# Patient Record
Sex: MALE | Race: ASIAN | NOT HISPANIC OR LATINO | URBAN - METROPOLITAN AREA
[De-identification: names, ages, dates, MRNs, and addresses within clinical notes are randomized per-mention and may not be internally consistent; named-entity substitution may affect disease eponyms.]

---

## 2018-09-05 ENCOUNTER — NEW PATIENT (OUTPATIENT)
Dept: URBAN - METROPOLITAN AREA CLINIC 30 | Facility: CLINIC | Age: 68
End: 2018-09-05
Payer: MEDICARE

## 2018-09-05 DIAGNOSIS — Z96.1 PRESENCE OF INTRAOCULAR LENS: ICD-10-CM

## 2018-09-05 DIAGNOSIS — E10.9 TYPE 1 DIABETES MELLITUS WITHOUT COMPLICATIONS: Primary | ICD-10-CM

## 2018-09-05 DIAGNOSIS — H26.492 OTHER SECONDARY CATARACT, LEFT EYE: ICD-10-CM

## 2018-09-05 DIAGNOSIS — H31.011 MACULA SCARS OF POSTERIOR POLE (POST TRAUMATIC), RIGHT EYE: ICD-10-CM

## 2018-09-05 DIAGNOSIS — H43.813 VITREOUS DEGENERATION, BILATERAL: ICD-10-CM

## 2018-09-05 PROCEDURE — 76514 ECHO EXAM OF EYE THICKNESS: CPT | Performed by: OPTOMETRIST

## 2018-09-05 PROCEDURE — 99204 OFFICE O/P NEW MOD 45 MIN: CPT | Performed by: OPTOMETRIST

## 2018-09-05 PROCEDURE — 92134 CPTRZ OPH DX IMG PST SGM RTA: CPT | Performed by: OPTOMETRIST

## 2018-09-05 RX ORDER — BRIMONIDINE TARTRATE, TIMOLOL MALEATE 2; 5 MG/ML; MG/ML
SOLUTION/ DROPS OPHTHALMIC
Qty: 1 | Refills: 4 | Status: INACTIVE
Start: 2018-09-05 | End: 2019-02-20

## 2018-09-05 RX ORDER — BIMATOPROST 0.1 MG/ML
0.01 % SOLUTION/ DROPS OPHTHALMIC
Qty: 1 | Refills: 4 | Status: INACTIVE
Start: 2018-09-05 | End: 2019-02-20

## 2018-09-05 ASSESSMENT — KERATOMETRY
OS: 38.10
OD: 37.04

## 2018-09-05 ASSESSMENT — INTRAOCULAR PRESSURE
OS: 8
OD: 17

## 2018-09-05 ASSESSMENT — VISUAL ACUITY: OS: 20/40

## 2018-09-19 ENCOUNTER — FOLLOW UP ESTABLISHED (OUTPATIENT)
Dept: URBAN - METROPOLITAN AREA CLINIC 30 | Facility: CLINIC | Age: 68
End: 2018-09-19
Payer: MEDICARE

## 2018-09-19 DIAGNOSIS — H40.1133 PRIMARY OPEN-ANGLE GLAUCOMA, BILATERAL, SEVERE STAGE: Primary | ICD-10-CM

## 2018-09-19 PROCEDURE — 99213 OFFICE O/P EST LOW 20 MIN: CPT | Performed by: OPTOMETRIST

## 2018-09-19 PROCEDURE — 92083 EXTENDED VISUAL FIELD XM: CPT | Performed by: OPTOMETRIST

## 2018-09-19 ASSESSMENT — VISUAL ACUITY
OS: 20/30
OD: 20/HM

## 2018-09-19 ASSESSMENT — INTRAOCULAR PRESSURE
OS: 15
OD: 16

## 2018-10-09 ENCOUNTER — Encounter (OUTPATIENT)
Dept: URBAN - METROPOLITAN AREA CLINIC 30 | Facility: CLINIC | Age: 68
End: 2018-10-09
Payer: MEDICARE

## 2018-10-09 DIAGNOSIS — Z01.818 ENCOUNTER FOR OTHER PREPROCEDURAL EXAMINATION: Primary | ICD-10-CM

## 2018-10-09 PROCEDURE — 99213 OFFICE O/P EST LOW 20 MIN: CPT | Performed by: PHYSICIAN ASSISTANT

## 2018-10-09 RX ORDER — METFORMIN HYDROCHLORIDE 1000 MG/1
TABLET, FILM COATED ORAL
Qty: 0 | Refills: 0 | Status: INACTIVE
Start: 2018-10-09 | End: 2019-06-21

## 2018-10-09 RX ORDER — OXYCODONE HYDROCHLORIDE 15 MG/1
15 MG TABLET ORAL
Qty: 0 | Refills: 0 | Status: INACTIVE
Start: 2018-10-09 | End: 2019-02-20

## 2018-10-30 ENCOUNTER — SURGERY (OUTPATIENT)
Dept: URBAN - METROPOLITAN AREA SURGERY 12 | Facility: SURGERY | Age: 68
End: 2018-10-30
Payer: MEDICARE

## 2018-10-30 PROCEDURE — 66821 AFTER CATARACT LASER SURGERY: CPT | Performed by: OPHTHALMOLOGY

## 2019-02-20 ENCOUNTER — FOLLOW UP ESTABLISHED (OUTPATIENT)
Dept: URBAN - METROPOLITAN AREA CLINIC 30 | Facility: CLINIC | Age: 69
End: 2019-02-20
Payer: MEDICARE

## 2019-02-20 DIAGNOSIS — H10.45 OTHER CHRONIC ALLERGIC CONJUNCTIVITIS: Primary | ICD-10-CM

## 2019-02-20 PROCEDURE — 92012 INTRM OPH EXAM EST PATIENT: CPT | Performed by: OPTOMETRIST

## 2019-02-20 RX ORDER — OXYCODONE HYDROCHLORIDE 15 MG/1
15 MG TABLET ORAL
Qty: 0 | Refills: 0 | Status: INACTIVE
Start: 2019-02-20 | End: 2019-06-21

## 2019-02-20 RX ORDER — AZELASTINE HYDROCHLORIDE 0.5 MG/ML
0.05 % SOLUTION/ DROPS OPHTHALMIC
Qty: 1 | Refills: 11 | Status: INACTIVE
Start: 2019-02-20 | End: 2019-06-21

## 2019-02-20 RX ORDER — BIMATOPROST 0.1 MG/ML
0.01 % SOLUTION/ DROPS OPHTHALMIC
Qty: 1 | Refills: 4 | Status: INACTIVE
Start: 2019-02-20 | End: 2019-11-05

## 2019-02-20 RX ORDER — PREDNISOLONE ACETATE 10 MG/ML
1 % SUSPENSION/ DROPS OPHTHALMIC
Qty: 1 | Refills: 0 | Status: INACTIVE
Start: 2019-02-20 | End: 2019-06-21

## 2019-02-20 RX ORDER — BRIMONIDINE TARTRATE, TIMOLOL MALEATE 2; 5 MG/ML; MG/ML
SOLUTION/ DROPS OPHTHALMIC
Qty: 1 | Refills: 4 | Status: INACTIVE
Start: 2019-02-20 | End: 2019-10-22

## 2019-02-20 ASSESSMENT — INTRAOCULAR PRESSURE
OS: 24
OD: 25

## 2019-06-21 ENCOUNTER — FOLLOW UP ESTABLISHED (OUTPATIENT)
Dept: URBAN - METROPOLITAN AREA CLINIC 30 | Facility: CLINIC | Age: 69
End: 2019-06-21
Payer: MEDICARE

## 2019-06-21 PROCEDURE — 99213 OFFICE O/P EST LOW 20 MIN: CPT | Performed by: OPTOMETRIST

## 2019-06-21 ASSESSMENT — INTRAOCULAR PRESSURE
OD: 21
OS: 20

## 2019-09-20 ENCOUNTER — FOLLOW UP ESTABLISHED (OUTPATIENT)
Dept: URBAN - METROPOLITAN AREA CLINIC 30 | Facility: CLINIC | Age: 69
End: 2019-09-20
Payer: MEDICARE

## 2019-09-20 DIAGNOSIS — E11.9 TYPE 2 DIABETES MELLITUS WITHOUT COMPLICATIONS: ICD-10-CM

## 2019-09-20 DIAGNOSIS — Z79.4 LONG TERM (CURRENT) USE OF INSULIN: ICD-10-CM

## 2019-09-20 PROCEDURE — 92014 COMPRE OPH EXAM EST PT 1/>: CPT | Performed by: OPTOMETRIST

## 2019-09-20 PROCEDURE — 92133 CPTRZD OPH DX IMG PST SGM ON: CPT | Performed by: OPTOMETRIST

## 2019-09-20 ASSESSMENT — KERATOMETRY
OS: 38.27
OD: 38.25

## 2019-09-20 ASSESSMENT — VISUAL ACUITY
OD: 20/NLP
OS: 20/25

## 2019-09-20 ASSESSMENT — INTRAOCULAR PRESSURE
OS: 16
OD: 18

## 2019-12-20 ENCOUNTER — FOLLOW UP ESTABLISHED (OUTPATIENT)
Dept: URBAN - METROPOLITAN AREA CLINIC 30 | Facility: CLINIC | Age: 69
End: 2019-12-20
Payer: MEDICARE

## 2019-12-20 PROCEDURE — 99213 OFFICE O/P EST LOW 20 MIN: CPT | Performed by: OPTOMETRIST

## 2019-12-20 PROCEDURE — 92083 EXTENDED VISUAL FIELD XM: CPT | Performed by: OPTOMETRIST

## 2019-12-20 ASSESSMENT — INTRAOCULAR PRESSURE
OS: 10
OD: 18

## 2020-02-26 ENCOUNTER — NEW PATIENT (OUTPATIENT)
Dept: URBAN - METROPOLITAN AREA CLINIC 24 | Facility: CLINIC | Age: 70
End: 2020-02-26
Payer: MEDICARE

## 2020-02-26 PROCEDURE — 92014 COMPRE OPH EXAM EST PT 1/>: CPT | Performed by: OPHTHALMOLOGY

## 2020-02-26 PROCEDURE — 92250 FUNDUS PHOTOGRAPHY W/I&R: CPT | Performed by: OPHTHALMOLOGY

## 2020-02-26 PROCEDURE — 92083 EXTENDED VISUAL FIELD XM: CPT | Performed by: OPHTHALMOLOGY

## 2020-02-26 PROCEDURE — 92020 GONIOSCOPY: CPT | Performed by: OPHTHALMOLOGY

## 2020-02-26 ASSESSMENT — INTRAOCULAR PRESSURE
OD: 14
OS: 10

## 2020-03-12 ENCOUNTER — FOLLOW UP ESTABLISHED (OUTPATIENT)
Dept: URBAN - METROPOLITAN AREA CLINIC 24 | Facility: CLINIC | Age: 70
End: 2020-03-12
Payer: MEDICARE

## 2020-03-12 PROCEDURE — 92014 COMPRE OPH EXAM EST PT 1/>: CPT | Performed by: OPHTHALMOLOGY

## 2020-03-12 ASSESSMENT — INTRAOCULAR PRESSURE
OD: 26
OS: 14

## 2020-05-20 ENCOUNTER — FOLLOW UP ESTABLISHED (OUTPATIENT)
Dept: URBAN - METROPOLITAN AREA CLINIC 24 | Facility: CLINIC | Age: 70
End: 2020-05-20
Payer: MEDICARE

## 2020-05-20 PROCEDURE — 92014 COMPRE OPH EXAM EST PT 1/>: CPT | Performed by: OPHTHALMOLOGY

## 2020-05-20 RX ORDER — DORZOLAMIDE HYDROCHLORIDE AND TIMOLOL MALEATE 20; 5 MG/ML; MG/ML
SOLUTION/ DROPS OPHTHALMIC
Qty: 1 | Refills: 1 | Status: INACTIVE
Start: 2020-05-20 | End: 2020-11-02

## 2020-05-20 ASSESSMENT — INTRAOCULAR PRESSURE
OD: 22
OS: 15

## 2020-06-17 ENCOUNTER — FOLLOW UP ESTABLISHED (OUTPATIENT)
Dept: URBAN - METROPOLITAN AREA CLINIC 24 | Facility: CLINIC | Age: 70
End: 2020-06-17
Payer: MEDICARE

## 2020-06-17 PROCEDURE — 92012 INTRM OPH EXAM EST PATIENT: CPT | Performed by: OPHTHALMOLOGY

## 2020-06-17 ASSESSMENT — INTRAOCULAR PRESSURE
OD: 14
OS: 11

## 2020-10-14 ENCOUNTER — FOLLOW UP ESTABLISHED (OUTPATIENT)
Dept: URBAN - METROPOLITAN AREA CLINIC 30 | Facility: CLINIC | Age: 70
End: 2020-10-14
Payer: MEDICARE

## 2020-10-14 PROCEDURE — 92014 COMPRE OPH EXAM EST PT 1/>: CPT | Performed by: OPHTHALMOLOGY

## 2020-10-14 ASSESSMENT — INTRAOCULAR PRESSURE
OS: 15
OD: 17

## 2020-10-23 ENCOUNTER — SURGERY (OUTPATIENT)
Dept: URBAN - METROPOLITAN AREA SURGERY 12 | Facility: SURGERY | Age: 70
End: 2020-10-23
Payer: MEDICARE

## 2020-10-23 PROCEDURE — 65855 TRABECULOPLASTY LASER SURG: CPT | Performed by: OPHTHALMOLOGY

## 2021-01-08 ENCOUNTER — FOLLOW UP ESTABLISHED (OUTPATIENT)
Dept: URBAN - METROPOLITAN AREA CLINIC 10 | Facility: CLINIC | Age: 71
End: 2021-01-08
Payer: MEDICARE

## 2021-01-08 PROCEDURE — 99213 OFFICE O/P EST LOW 20 MIN: CPT | Performed by: OPHTHALMOLOGY

## 2021-01-08 ASSESSMENT — INTRAOCULAR PRESSURE
OS: 11
OD: 19

## 2021-03-31 ENCOUNTER — OFFICE VISIT (OUTPATIENT)
Dept: URBAN - METROPOLITAN AREA CLINIC 30 | Facility: CLINIC | Age: 71
End: 2021-03-31
Payer: MEDICARE

## 2021-03-31 PROCEDURE — 99213 OFFICE O/P EST LOW 20 MIN: CPT | Performed by: OPHTHALMOLOGY

## 2021-03-31 ASSESSMENT — INTRAOCULAR PRESSURE
OS: 11
OD: 22

## 2021-03-31 NOTE — IMPRESSION/PLAN
Impression: Primary open-angle glaucoma, severe stage, bilateral
Previous medications used: Combigan - pain and lid irriation with use, recommend patient stop Combigan * Monocular patient. Plan: Pt has Glaucoma    Gonio : ss trace pg / cbb 1+ pg        Pachs: 482/485    Today's IOP :   22, 11 Tmax & date :  25/24 02/20/19 Target IOP low to mid teens Pt denies Fhx of Glaucoma Left eye is the better seeing eye HVF:  OD unable OS inferior hemifield with small SN scotoma C/D: .75-. 8 thin temporal rim OS 
OCT: mod loss OS Pt denies Sulfa Allergy   // Pt denies Lung /Heart dx Plan :
1. Cont:
Lumigan QHS OU Dorzol/Timolol bid ou 2. Patient's IOP is stable. No changes being made. 3. Return in 3 months for IOP check with optometry.  
4. Return in 6 months for IOP  check, VF and RNFL with Dr Rebecca Amador

## 2021-06-30 ENCOUNTER — OFFICE VISIT (OUTPATIENT)
Dept: URBAN - METROPOLITAN AREA CLINIC 30 | Facility: CLINIC | Age: 71
End: 2021-06-30
Payer: MEDICARE

## 2021-06-30 PROCEDURE — 99213 OFFICE O/P EST LOW 20 MIN: CPT | Performed by: OPTOMETRIST

## 2021-06-30 ASSESSMENT — INTRAOCULAR PRESSURE
OS: 10
OS: 12
OD: 14

## 2021-06-30 NOTE — IMPRESSION/PLAN
Impression: Primary open-angle glaucoma, severe stage, bilateral
Previous medications used: Combigan - pain and lid irritation with use, recommend patient stop Combigan Pachs: 482/485 Target IOP low to mid teens/  Tmax & date :  25/24 02/20/19 Pt denies Fhx of Glaucoma Left eye is the better seeing eye Pt denies Sulfa Allergy   // Pt denies Lung /Heart dx * Monocular patient. Plan: Pt has Glaucoma    Gonio : ss trace pg / cbb 1+ pg     Today's IOP :   (14,12) doing well on Lumigan qhs OU and dorzolamide/timolol BID OU. HVF:  OD unable OS inferior hemifield with small SN scotoma C/D: .75-. 8 thin temporal rim OS 
OCT: mod loss OS

CPM. See Dr. Harmeet Marcelino as scheduled

## 2021-12-03 ENCOUNTER — OFFICE VISIT (OUTPATIENT)
Dept: URBAN - METROPOLITAN AREA CLINIC 24 | Facility: CLINIC | Age: 71
End: 2021-12-03
Payer: MEDICARE

## 2021-12-03 PROCEDURE — 99213 OFFICE O/P EST LOW 20 MIN: CPT | Performed by: OPHTHALMOLOGY

## 2021-12-03 RX ORDER — BIMATOPROST 0.1 MG/ML
0.01 % SOLUTION/ DROPS OPHTHALMIC
Qty: 2.5 | Refills: 1 | Status: INACTIVE
Start: 2021-12-03 | End: 2021-12-08

## 2021-12-03 RX ORDER — DORZOLAMIDE HYDROCHLORIDE AND TIMOLOL MALEATE 20; 5 MG/ML; MG/ML
SOLUTION/ DROPS OPHTHALMIC
Qty: 10 | Refills: 0 | Status: INACTIVE
Start: 2021-12-03 | End: 2022-01-12

## 2021-12-03 ASSESSMENT — INTRAOCULAR PRESSURE
OD: 15
OS: 11

## 2021-12-03 NOTE — IMPRESSION/PLAN
Impression: Primary open-angle glaucoma, severe stage, bilateral
Previous medications used: Combigan - pain and lid irriation with use, recommend patient stop Combigan * Monocular patient. Plan: Pt has Glaucoma    Gonio : ss trace pg / cbb 1+ pg        Pachs: 482/485    Today's IOP :  15/11 Tmax  :  25/24 02/20/19 Target IOP low to mid teens Pt denies Fhx of Glaucoma Left eye is the better seeing eye HVF:  OD unable OS inferior hemifield with small SN scotoma C/D: .75-. 8 thin temporal rim OS 
OCT: mod loss OS Pt denies Sulfa Allergy   // Pt denies Lung /Heart dx Plan :
1. Cont:
Lumigan QHS OU Dorzol/Timolol bid ou 2. Patient's IOP is stable. No changes being made.  
3. Return in 3 months IOP  check, VF and RNFL with Dr Love Zepeda

## 2022-03-03 ENCOUNTER — OFFICE VISIT (OUTPATIENT)
Dept: URBAN - METROPOLITAN AREA CLINIC 24 | Facility: CLINIC | Age: 72
End: 2022-03-03
Payer: MEDICARE

## 2022-03-03 DIAGNOSIS — H04.123 DRY EYE SYNDROME OF BILATERAL LACRIMAL GLANDS: ICD-10-CM

## 2022-03-03 PROCEDURE — 92083 EXTENDED VISUAL FIELD XM: CPT | Performed by: OPHTHALMOLOGY

## 2022-03-03 PROCEDURE — 99213 OFFICE O/P EST LOW 20 MIN: CPT | Performed by: OPHTHALMOLOGY

## 2022-03-03 ASSESSMENT — INTRAOCULAR PRESSURE
OD: 14
OS: 9

## 2022-03-03 NOTE — IMPRESSION/PLAN
Impression: Primary open-angle glaucoma, severe stage, bilateral
Previous medications used: Combigan - pain and lid irriation with use, recommend patient stop Combigan * Monocular patient. Plan: Pt has Glaucoma    Gonio : ss trace pg / cbb 1+ pg        Pachs: 482/485    Today's IOP :  14/9      Tmax  :  25/24 02/20/19 Target IOP low to mid teens Pt denies Fhx of Glaucoma Left eye is the better seeing eye HVF:  OD unable OS inferior hemifield with small SN scotoma C/D: .75-. 8 thin temporal rim OS 
OCT: mod loss OS Pt denies Sulfa Allergy // Pt denies Lung /Heart dx Plan :
1. Cont:
Lumigan QHS OU Dorzol/Timolol bid ou 2. Patient's IOP is stable. No changes being made.  
3. Return in 3 months IOP  check with Dr. Mounika Dove then 6 months with repeat HVF OS Dr. Matt Rosas

## 2022-06-17 ENCOUNTER — OFFICE VISIT (OUTPATIENT)
Dept: URBAN - METROPOLITAN AREA CLINIC 24 | Facility: CLINIC | Age: 72
End: 2022-06-17
Payer: MEDICARE

## 2022-06-17 DIAGNOSIS — H40.1133 PRIMARY OPEN-ANGLE GLAUCOMA, BILATERAL, SEVERE STAGE: Primary | ICD-10-CM

## 2022-06-17 DIAGNOSIS — H04.123 DRY EYE SYNDROME OF BILATERAL LACRIMAL GLANDS: ICD-10-CM

## 2022-06-17 PROCEDURE — 99213 OFFICE O/P EST LOW 20 MIN: CPT | Performed by: OPHTHALMOLOGY

## 2022-06-17 ASSESSMENT — INTRAOCULAR PRESSURE
OD: 13
OS: 8

## 2022-06-17 NOTE — IMPRESSION/PLAN
Impression: Primary open-angle glaucoma, severe stage, bilateral
Previous medications used: Combigan - pain and lid irriation with use, recommend patient stop Combigan * Monocular patient. Plan: Pt has Glaucoma    Gonio: ss trace pg / cbb 1+ pg        Pachs: 482/485    Today's IOP :  13/8    Tmax  :  25/24 02/20/19 Target IOP low to mid teens Pt denies Fhx of Glaucoma Left eye is the better seeing eye HVF:  OD unable OS inferior hemifield with small SN scotoma C/D: .75-. 8 thin temporal rim OS 
OCT: mod loss OS Pt denies Sulfa Allergy // Pt denies Lung /Heart dx Plan :
1. Continue:
Lumigan QHS OU Dorzolamide/Timolol bid ou 2. Patient's IOP is stable. No changes being made. 3. Return as scheduled in [address] [place] [address] for iop check.

## 2022-12-07 ENCOUNTER — OFFICE VISIT (OUTPATIENT)
Dept: URBAN - METROPOLITAN AREA CLINIC 30 | Facility: CLINIC | Age: 72
End: 2022-12-07
Payer: MEDICARE

## 2022-12-07 DIAGNOSIS — H40.1133 PRIMARY OPEN-ANGLE GLAUCOMA, BILATERAL, SEVERE STAGE: Primary | ICD-10-CM

## 2022-12-07 PROCEDURE — 99213 OFFICE O/P EST LOW 20 MIN: CPT | Performed by: OPHTHALMOLOGY

## 2022-12-07 PROCEDURE — 92083 EXTENDED VISUAL FIELD XM: CPT | Performed by: OPHTHALMOLOGY

## 2022-12-07 ASSESSMENT — INTRAOCULAR PRESSURE
OS: 13
OD: 16

## 2022-12-07 NOTE — IMPRESSION/PLAN
Impression: Primary open-angle glaucoma, severe stage, bilateral
Previous medications used: Combigan - pain and lid irriation with use, recommend patient stop Combigan * Monocular patient. Plan: Pt has Glaucoma    Gonio: ss trace pg / cbb 1+ pg        Pachs: 482/485    Today's IOP :  16/13  Tmax  :  25/24 02/20/19 Target IOP low to mid teens Pt denies Fhx of Glaucoma Left eye is the better seeing eye HVF:  OD unable OS inferior hemifield with small SN scotoma C/D: .75-. 8 thin temporal rim OS 
OCT: mod loss OS Pt denies Sulfa Allergy // Pt denies Lung /Heart dx Plan :
1. Continue:
Lumigan QHS OU Dorzolamide/Timolol bid ou 2. Patient's IOP is stable. No changes being made.

## 2023-03-29 ENCOUNTER — OFFICE VISIT (OUTPATIENT)
Dept: URBAN - METROPOLITAN AREA CLINIC 30 | Facility: CLINIC | Age: 73
End: 2023-03-29
Payer: MEDICARE

## 2023-03-29 DIAGNOSIS — H40.1133 PRIMARY OPEN-ANGLE GLAUCOMA, BILATERAL, SEVERE STAGE: Primary | ICD-10-CM

## 2023-03-29 PROCEDURE — 99213 OFFICE O/P EST LOW 20 MIN: CPT | Performed by: OPHTHALMOLOGY

## 2023-03-29 ASSESSMENT — INTRAOCULAR PRESSURE
OS: 16
OD: 23
OS: 11
OD: 17

## 2023-03-29 NOTE — IMPRESSION/PLAN
Impression: Primary open-angle glaucoma, severe stage, bilateral
(+)Pancreatic cancer (S/P [facility]ipple) Previous medications used: Combigan - pain and lid irriation with use, recommend patient stop Combigan * Monocular patient. Plan: Pt has Glaucoma    Gonio: ss trace pg / cbb 1+ pg        Pachs: 482/485    Today's IOP :  23/16Tmax  :  25/24 02/20/19 Target IOP low to mid teens Pt denies Fhx of Glaucoma Left eye is the better seeing eye HVF:  OD unable OS inferior hemifield with small SN scotoma C/D: .75-. 8 thin temporal rim OS 
OCT: mod loss OS Pt denies Sulfa Allergy // Pt denies Lung /Heart dx Plan :
1. Continue:
Lumigan QHS OU Dorzolamide/Timolol bid ou 2. Patient's IOP is high today. Patient currently experiencing pain 2/2 Pancreatic cancer. VF results also poor.  
3. RTC 1 month with Dr. Antonio Darling If no improvement in IOP or confirmed decrease on VF send patient for SLT

## 2023-04-19 ENCOUNTER — OFFICE VISIT (OUTPATIENT)
Dept: URBAN - METROPOLITAN AREA CLINIC 30 | Facility: CLINIC | Age: 73
End: 2023-04-19
Payer: MEDICARE

## 2023-04-19 DIAGNOSIS — H40.1133 PRIMARY OPEN-ANGLE GLAUCOMA, BILATERAL, SEVERE STAGE: Primary | ICD-10-CM

## 2023-04-19 PROCEDURE — 92083 EXTENDED VISUAL FIELD XM: CPT

## 2023-04-19 PROCEDURE — 99214 OFFICE O/P EST MOD 30 MIN: CPT

## 2023-04-19 ASSESSMENT — INTRAOCULAR PRESSURE
OD: 22
OS: 19
OD: 24
OS: 16

## 2023-04-19 NOTE — IMPRESSION/PLAN
Impression: Primary open-angle glaucoma, severe stage, bilateral
(+)Pancreatic cancer (S/P [facility]ipple) Previous medications used: Combigan - pain and lid irriation with use, recommend patient stop Combigan * Monocular patient. Plan: Pt has Glaucoma    Gonio: ss trace pg / cbb 1+ pg        Pachs: 482/485    Today's IOP :  24/16 Tmax  :  25/24 02/20/19 Target IOP low to mid teens Pt denies Fhx of Glaucoma Left eye is the better seeing eye HVF:  OD unable OS inferior hemifield with small SN scotoma C/D: .75-. 8 thin temporal rim OS 
OCT: mod loss OS Pt denies Sulfa Allergy // Pt denies Lung /Heart dx Today, IOP 24/16 on Lum 1/1, Cos 2/2.
24-2 HVF OS only: poor reliability high FL, generalized depression, ST sparing. Worse than prior. Would likely benefit from 10-2 in future. Plan :
1. Continue:
Lumigan QHS OU Dorzolamide/Timolol bid ou 2. Patient's IOP is high today. Patient currently experiencing pain 2/2 Pancreatic cancer. VF results also poor.  
3. Given no improvement in IOP +confirmed decrease on VF, will send patient for SLT  per Dr. Jeanne Acosta

## 2023-07-14 ENCOUNTER — OFFICE VISIT (OUTPATIENT)
Dept: URBAN - METROPOLITAN AREA CLINIC 30 | Facility: CLINIC | Age: 73
End: 2023-07-14
Payer: MEDICARE

## 2023-07-14 DIAGNOSIS — H40.1133 PRIMARY OPEN-ANGLE GLAUCOMA, BILATERAL, SEVERE STAGE: Primary | ICD-10-CM

## 2023-07-14 DIAGNOSIS — H04.123 DRY EYE SYNDROME OF BILATERAL LACRIMAL GLANDS: ICD-10-CM

## 2023-07-14 PROCEDURE — 99213 OFFICE O/P EST LOW 20 MIN: CPT

## 2023-07-14 ASSESSMENT — INTRAOCULAR PRESSURE
OD: 15
OS: 15

## 2023-07-14 NOTE — IMPRESSION/PLAN
Impression: Dry eye syndrome of bilateral lacrimal glands: H04.123. Plan: ATs QID OU. Has patch of irritated dry skin inf OS, rec ointment BID.

## 2023-07-14 NOTE — IMPRESSION/PLAN
Impression: Primary open-angle glaucoma, severe stage, bilateral
(+)Pancreatic cancer (S/P [facility]ipple) Previous medications used: Combigan - pain and lid irriation with use, recommend patient stop Combigan * Monocular patient. Plan: Pt has Glaucoma    Gonio: ss trace pg / cbb 1+ pg        Pachs: 482/485    Today's IOP :  15/15 Tmax  :  25/24 02/20/19 Target IOP low to mid teens Pt denies Fhx of Glaucoma Left eye is the better seeing eye HVF:  OD unable OS inferior hemifield with small SN scotoma C/D: .75-. 8 thin temporal rim OS 
OCT: mod loss OS Pt denies Sulfa Allergy // Pt denies Lung /Heart dx 24-2 HVF OS only 4/2023: poor reliability high FL, generalized depression, ST sparing. Worse than prior. Would likely benefit from 10-2 in future. Now s/p  OS 6/2023 Today, IOP 15/15 on Lum 1/1, Cos 2/2. Plan :
1. Continue:
Lumigan QHS OU Dorzolamide/Timolol bid ou 
2. IOP improved s/p SLT OS.  F/u 2-3 mo Shirlene Pallas

## 2023-09-13 ENCOUNTER — OFFICE VISIT (OUTPATIENT)
Dept: URBAN - METROPOLITAN AREA CLINIC 30 | Facility: CLINIC | Age: 73
End: 2023-09-13
Payer: MEDICARE

## 2023-09-13 DIAGNOSIS — H40.1133 PRIMARY OPEN-ANGLE GLAUCOMA, BILATERAL, SEVERE STAGE: Primary | ICD-10-CM

## 2023-09-13 DIAGNOSIS — H04.123 DRY EYE SYNDROME OF BILATERAL LACRIMAL GLANDS: ICD-10-CM

## 2023-09-13 PROCEDURE — 99213 OFFICE O/P EST LOW 20 MIN: CPT | Performed by: OPHTHALMOLOGY

## 2023-09-13 RX ORDER — ERYTHROMYCIN 5 MG/G
OINTMENT OPHTHALMIC
Qty: 3.5 | Refills: 1 | Status: ACTIVE
Start: 2023-09-13

## 2023-09-13 ASSESSMENT — INTRAOCULAR PRESSURE
OS: 11
OD: 21

## 2023-12-13 ENCOUNTER — OFFICE VISIT (OUTPATIENT)
Dept: URBAN - METROPOLITAN AREA CLINIC 30 | Facility: CLINIC | Age: 73
End: 2023-12-13
Payer: MEDICARE

## 2023-12-13 DIAGNOSIS — H40.1133 PRIMARY OPEN-ANGLE GLAUCOMA, BILATERAL, SEVERE STAGE: Primary | ICD-10-CM

## 2023-12-13 PROCEDURE — 92083 EXTENDED VISUAL FIELD XM: CPT

## 2023-12-13 PROCEDURE — 99214 OFFICE O/P EST MOD 30 MIN: CPT

## 2023-12-13 ASSESSMENT — INTRAOCULAR PRESSURE
OD: 22
OS: 15

## 2023-12-13 ASSESSMENT — KERATOMETRY
OS: 38.60
OD: 37.81

## 2024-01-03 ENCOUNTER — OFFICE VISIT (OUTPATIENT)
Dept: URBAN - METROPOLITAN AREA CLINIC 30 | Facility: CLINIC | Age: 74
End: 2024-01-03
Payer: MEDICARE

## 2024-01-03 DIAGNOSIS — H04.123 DRY EYE SYNDROME OF BILATERAL LACRIMAL GLANDS: Primary | ICD-10-CM

## 2024-01-03 DIAGNOSIS — H10.45 OTHER CHRONIC ALLERGIC CONJUNCTIVITIS: ICD-10-CM

## 2024-01-03 PROCEDURE — 99213 OFFICE O/P EST LOW 20 MIN: CPT

## 2024-01-03 ASSESSMENT — INTRAOCULAR PRESSURE
OD: 23
OS: 8

## 2024-03-15 ENCOUNTER — OFFICE VISIT (OUTPATIENT)
Dept: URBAN - METROPOLITAN AREA CLINIC 30 | Facility: CLINIC | Age: 74
End: 2024-03-15
Payer: MEDICARE

## 2024-03-15 DIAGNOSIS — H40.1133 PRIMARY OPEN-ANGLE GLAUCOMA, BILATERAL, SEVERE STAGE: Primary | ICD-10-CM

## 2024-03-15 PROCEDURE — 99213 OFFICE O/P EST LOW 20 MIN: CPT | Performed by: OPHTHALMOLOGY

## 2024-03-15 ASSESSMENT — INTRAOCULAR PRESSURE
OD: 22
OS: 10

## 2024-05-02 ENCOUNTER — OFFICE VISIT (OUTPATIENT)
Dept: URBAN - METROPOLITAN AREA CLINIC 30 | Facility: CLINIC | Age: 74
End: 2024-05-02
Payer: MEDICARE

## 2024-05-02 DIAGNOSIS — H04.123 DRY EYE SYNDROME OF BILATERAL LACRIMAL GLANDS: ICD-10-CM

## 2024-05-02 DIAGNOSIS — H40.1133 PRIMARY OPEN-ANGLE GLAUCOMA, BILATERAL, SEVERE STAGE: Primary | ICD-10-CM

## 2024-05-02 PROCEDURE — 99214 OFFICE O/P EST MOD 30 MIN: CPT

## 2024-05-02 RX ORDER — BIMATOPROST 0.1 MG/ML
0.01 % SOLUTION/ DROPS OPHTHALMIC
Qty: 7.5 | Refills: 5 | Status: ACTIVE
Start: 2024-05-02

## 2024-05-02 RX ORDER — BIMATOPROST 0.1 MG/ML
0.01 % SOLUTION/ DROPS OPHTHALMIC
Qty: 7.5 | Refills: 3 | Status: INACTIVE
Start: 2024-05-02 | End: 2024-05-02

## 2024-05-02 RX ORDER — DORZOLAMIDE HYDROCHLORIDE AND TIMOLOL MALEATE 20; 5 MG/ML; MG/ML
SOLUTION/ DROPS OPHTHALMIC
Qty: 10 | Refills: 3 | Status: INACTIVE
Start: 2024-05-02 | End: 2024-05-02

## 2024-05-02 RX ORDER — DORZOLAMIDE HYDROCHLORIDE AND TIMOLOL MALEATE 20; 5 MG/ML; MG/ML
SOLUTION/ DROPS OPHTHALMIC
Qty: 10 | Refills: 3 | Status: ACTIVE
Start: 2024-05-02

## 2024-05-02 ASSESSMENT — KERATOMETRY: OS: 37.91

## 2024-05-02 ASSESSMENT — INTRAOCULAR PRESSURE
OS: 20
OS: 17
OD: 23
OD: 22

## 2024-05-02 ASSESSMENT — VISUAL ACUITY: OS: 20/60

## 2024-05-16 ENCOUNTER — OFFICE VISIT (OUTPATIENT)
Dept: URBAN - METROPOLITAN AREA CLINIC 30 | Facility: CLINIC | Age: 74
End: 2024-05-16
Payer: MEDICARE

## 2024-05-16 DIAGNOSIS — H04.123 DRY EYE SYNDROME OF BILATERAL LACRIMAL GLANDS: ICD-10-CM

## 2024-05-16 DIAGNOSIS — H40.1133 PRIMARY OPEN-ANGLE GLAUCOMA, BILATERAL, SEVERE STAGE: Primary | ICD-10-CM

## 2024-05-16 PROCEDURE — 99214 OFFICE O/P EST MOD 30 MIN: CPT

## 2024-05-16 RX ORDER — BIMATOPROST 0.1 MG/ML
0.01 % SOLUTION/ DROPS OPHTHALMIC
Qty: 7.5 | Refills: 5 | Status: ACTIVE
Start: 2024-05-16

## 2024-05-16 RX ORDER — DORZOLAMIDE HYDROCHLORIDE AND TIMOLOL MALEATE 20; 5 MG/ML; MG/ML
SOLUTION/ DROPS OPHTHALMIC
Qty: 10 | Refills: 3 | Status: ACTIVE
Start: 2024-05-16

## 2024-05-16 ASSESSMENT — INTRAOCULAR PRESSURE
OD: 21
OS: 20
OD: 16
OS: 21

## 2024-05-29 ENCOUNTER — OFFICE VISIT (OUTPATIENT)
Dept: URBAN - METROPOLITAN AREA CLINIC 24 | Facility: CLINIC | Age: 74
End: 2024-05-29
Payer: MEDICARE

## 2024-05-29 DIAGNOSIS — H40.1133 PRIMARY OPEN-ANGLE GLAUCOMA, BILATERAL, SEVERE STAGE: Primary | ICD-10-CM

## 2024-05-29 PROCEDURE — 99214 OFFICE O/P EST MOD 30 MIN: CPT | Performed by: OPHTHALMOLOGY

## 2024-05-29 ASSESSMENT — INTRAOCULAR PRESSURE
OD: 2
OS: 9

## 2024-07-02 ENCOUNTER — OFFICE VISIT (OUTPATIENT)
Dept: URBAN - METROPOLITAN AREA CLINIC 30 | Facility: CLINIC | Age: 74
End: 2024-07-02
Payer: MEDICARE

## 2024-07-02 DIAGNOSIS — H04.123 DRY EYE SYNDROME OF BILATERAL LACRIMAL GLANDS: ICD-10-CM

## 2024-07-02 DIAGNOSIS — H40.1133 PRIMARY OPEN-ANGLE GLAUCOMA, BILATERAL, SEVERE STAGE: Primary | ICD-10-CM

## 2024-07-02 PROCEDURE — 99214 OFFICE O/P EST MOD 30 MIN: CPT

## 2024-07-02 RX ORDER — HYPROMELLOSE 0.3 G/ML
0.3 % GEL OPHTHALMIC
Qty: 5 | Refills: 11 | Status: ACTIVE
Start: 2024-07-02

## 2024-07-02 RX ORDER — PROPYLENE GLYCOL 0.6 G/100ML
0.6 % LIQUID OPHTHALMIC
Qty: 30 | Refills: 11 | Status: ACTIVE
Start: 2024-07-02

## 2024-07-02 ASSESSMENT — INTRAOCULAR PRESSURE
OS: 14
OS: 13
OD: 12
OD: 13

## 2024-07-19 ENCOUNTER — OFFICE VISIT (OUTPATIENT)
Dept: URBAN - METROPOLITAN AREA CLINIC 30 | Facility: CLINIC | Age: 74
End: 2024-07-19
Payer: MEDICARE

## 2024-07-19 DIAGNOSIS — H40.1133 PRIMARY OPEN-ANGLE GLAUCOMA, BILATERAL, SEVERE STAGE: Primary | ICD-10-CM

## 2024-07-19 DIAGNOSIS — H04.123 DRY EYE SYNDROME OF BILATERAL LACRIMAL GLANDS: ICD-10-CM

## 2024-07-19 PROCEDURE — BRUDE BRUDER EYE MOIST COMPRESS: CUSTOM | Performed by: OPTOMETRIST

## 2024-07-19 PROCEDURE — 99214 OFFICE O/P EST MOD 30 MIN: CPT | Performed by: OPTOMETRIST

## 2024-07-19 RX ORDER — VIT E/DHA/LUT/ZEAX/ASTAX/BILB 25-220-5
CAPSULE ORAL
Qty: 120 | Refills: 12 | Status: ACTIVE
Start: 2024-07-19

## 2024-08-14 ENCOUNTER — OFFICE VISIT (OUTPATIENT)
Dept: URBAN - METROPOLITAN AREA CLINIC 30 | Facility: CLINIC | Age: 74
End: 2024-08-14
Payer: MEDICARE

## 2024-08-14 DIAGNOSIS — H40.1133 PRIMARY OPEN-ANGLE GLAUCOMA, BILATERAL, SEVERE STAGE: Primary | ICD-10-CM

## 2024-08-14 PROCEDURE — 99213 OFFICE O/P EST LOW 20 MIN: CPT | Performed by: OPHTHALMOLOGY

## 2024-08-14 ASSESSMENT — INTRAOCULAR PRESSURE
OD: 35
OS: 28

## 2024-08-22 ENCOUNTER — OFFICE VISIT (OUTPATIENT)
Dept: URBAN - METROPOLITAN AREA CLINIC 24 | Facility: CLINIC | Age: 74
End: 2024-08-22
Payer: MEDICARE

## 2024-08-22 DIAGNOSIS — H40.1133 PRIMARY OPEN-ANGLE GLAUCOMA, BILATERAL, SEVERE STAGE: Primary | ICD-10-CM

## 2024-08-22 PROCEDURE — 99213 OFFICE O/P EST LOW 20 MIN: CPT | Performed by: OPHTHALMOLOGY

## 2024-08-22 ASSESSMENT — INTRAOCULAR PRESSURE
OS: 19
OD: 20

## 2024-09-20 ENCOUNTER — OFFICE VISIT (OUTPATIENT)
Dept: URBAN - METROPOLITAN AREA CLINIC 30 | Facility: CLINIC | Age: 74
End: 2024-09-20
Payer: MEDICARE

## 2024-09-20 DIAGNOSIS — H40.1133 PRIMARY OPEN-ANGLE GLAUCOMA, BILATERAL, SEVERE STAGE: ICD-10-CM

## 2024-09-20 DIAGNOSIS — Z48.810 ENCOUNTER FOR SURGICAL AFTERCARE FOLLOWING SURGERY ON A SENSE ORGAN: Primary | ICD-10-CM

## 2024-09-20 PROCEDURE — 99024 POSTOP FOLLOW-UP VISIT: CPT | Performed by: OPHTHALMOLOGY

## 2024-09-20 ASSESSMENT — INTRAOCULAR PRESSURE: OS: 10

## 2024-10-09 ENCOUNTER — POST-OPERATIVE VISIT (OUTPATIENT)
Dept: URBAN - METROPOLITAN AREA CLINIC 30 | Facility: CLINIC | Age: 74
End: 2024-10-09
Payer: MEDICARE

## 2024-10-09 DIAGNOSIS — Z48.810 ENCOUNTER FOR SURGICAL AFTERCARE FOLLOWING SURGERY ON A SENSE ORGAN: ICD-10-CM

## 2024-10-09 DIAGNOSIS — H40.1133 PRIMARY OPEN-ANGLE GLAUCOMA, BILATERAL, SEVERE STAGE: Primary | ICD-10-CM

## 2024-10-09 PROCEDURE — 99024 POSTOP FOLLOW-UP VISIT: CPT | Performed by: OPHTHALMOLOGY

## 2024-10-09 ASSESSMENT — INTRAOCULAR PRESSURE
OS: 15
OD: 16

## 2024-11-13 ENCOUNTER — OFFICE VISIT (OUTPATIENT)
Dept: URBAN - METROPOLITAN AREA CLINIC 24 | Facility: CLINIC | Age: 74
End: 2024-11-13
Payer: MEDICARE

## 2024-11-13 DIAGNOSIS — H40.1133 PRIMARY OPEN-ANGLE GLAUCOMA, BILATERAL, SEVERE STAGE: Primary | ICD-10-CM

## 2024-11-13 PROCEDURE — 99024 POSTOP FOLLOW-UP VISIT: CPT | Performed by: OPHTHALMOLOGY

## 2024-12-09 ENCOUNTER — OFFICE VISIT (OUTPATIENT)
Dept: URBAN - METROPOLITAN AREA CLINIC 30 | Facility: CLINIC | Age: 74
End: 2024-12-09
Payer: MEDICARE

## 2024-12-09 DIAGNOSIS — H40.1133 PRIMARY OPEN-ANGLE GLAUCOMA, BILATERAL, SEVERE STAGE: Primary | ICD-10-CM

## 2024-12-09 DIAGNOSIS — H31.011 MACULA SCARS OF POSTERIOR POLE (POSTINFLAMMATORY) (POST-TRAUMATIC), RIGHT EYE: ICD-10-CM

## 2024-12-09 PROCEDURE — 92134 CPTRZ OPH DX IMG PST SGM RTA: CPT | Performed by: OPTOMETRIST

## 2024-12-09 PROCEDURE — 99214 OFFICE O/P EST MOD 30 MIN: CPT | Performed by: OPTOMETRIST

## 2024-12-09 ASSESSMENT — INTRAOCULAR PRESSURE
OS: 20
OS: 14
OD: 16

## 2024-12-09 ASSESSMENT — VISUAL ACUITY
OD: NLP
OS: 20/400

## 2024-12-09 ASSESSMENT — KERATOMETRY: OS: 37.75

## 2025-02-19 ENCOUNTER — OFFICE VISIT (OUTPATIENT)
Dept: URBAN - METROPOLITAN AREA CLINIC 24 | Facility: CLINIC | Age: 75
End: 2025-02-19
Payer: MEDICARE

## 2025-02-19 DIAGNOSIS — H40.1133 PRIMARY OPEN-ANGLE GLAUCOMA, BILATERAL, SEVERE STAGE: Primary | ICD-10-CM

## 2025-02-19 PROCEDURE — 99214 OFFICE O/P EST MOD 30 MIN: CPT | Performed by: OPHTHALMOLOGY

## 2025-02-19 ASSESSMENT — INTRAOCULAR PRESSURE
OD: 28
OS: 9

## 2025-06-19 ENCOUNTER — OFFICE VISIT (OUTPATIENT)
Dept: URBAN - METROPOLITAN AREA CLINIC 24 | Facility: CLINIC | Age: 75
End: 2025-06-19
Payer: MEDICARE

## 2025-06-19 DIAGNOSIS — Z96.1 PRESENCE OF INTRAOCULAR LENS: ICD-10-CM

## 2025-06-19 DIAGNOSIS — H04.123 DRY EYE SYNDROME OF BILATERAL LACRIMAL GLANDS: ICD-10-CM

## 2025-06-19 DIAGNOSIS — H40.1133 PRIMARY OPEN-ANGLE GLAUCOMA, BILATERAL, SEVERE STAGE: Primary | ICD-10-CM

## 2025-06-19 PROCEDURE — 99213 OFFICE O/P EST LOW 20 MIN: CPT | Performed by: OPHTHALMOLOGY

## 2025-06-19 ASSESSMENT — INTRAOCULAR PRESSURE
OD: 20
OS: 8

## 2025-08-20 ENCOUNTER — OFFICE VISIT (OUTPATIENT)
Dept: URBAN - METROPOLITAN AREA CLINIC 30 | Facility: CLINIC | Age: 75
End: 2025-08-20
Payer: MEDICARE

## 2025-08-20 DIAGNOSIS — H40.1133 PRIMARY OPEN-ANGLE GLAUCOMA, BILATERAL, SEVERE STAGE: Primary | ICD-10-CM

## 2025-08-20 DIAGNOSIS — Z79.4 LONG TERM (CURRENT) USE OF INSULIN: ICD-10-CM

## 2025-08-20 DIAGNOSIS — H31.011 MACULA SCARS OF POSTERIOR POLE (POSTINFLAMMATORY) (POST-TRAUMATIC), RIGHT EYE: ICD-10-CM

## 2025-08-20 DIAGNOSIS — E11.9 TYPE 2 DIABETES MELLITUS WITHOUT COMPLICATIONS: ICD-10-CM

## 2025-08-20 PROCEDURE — 92014 COMPRE OPH EXAM EST PT 1/>: CPT | Performed by: OPTOMETRIST

## 2025-08-20 ASSESSMENT — INTRAOCULAR PRESSURE
OS: 10
OD: 20

## 2025-08-20 ASSESSMENT — VISUAL ACUITY: OS: 20/400
